# Patient Record
Sex: FEMALE | Race: WHITE | NOT HISPANIC OR LATINO | Employment: UNEMPLOYED | URBAN - METROPOLITAN AREA
[De-identification: names, ages, dates, MRNs, and addresses within clinical notes are randomized per-mention and may not be internally consistent; named-entity substitution may affect disease eponyms.]

---

## 2017-09-26 ENCOUNTER — ALLSCRIPTS OFFICE VISIT (OUTPATIENT)
Dept: OTHER | Facility: OTHER | Age: 52
End: 2017-09-26

## 2017-10-10 ENCOUNTER — ANESTHESIA EVENT (OUTPATIENT)
Dept: PERIOP | Facility: HOSPITAL | Age: 52
End: 2017-10-10
Payer: COMMERCIAL

## 2017-10-11 ENCOUNTER — HOSPITAL ENCOUNTER (OUTPATIENT)
Facility: HOSPITAL | Age: 52
Setting detail: OUTPATIENT SURGERY
Discharge: HOME | End: 2017-10-11
Attending: INTERNAL MEDICINE | Admitting: INTERNAL MEDICINE
Payer: COMMERCIAL

## 2017-10-11 ENCOUNTER — GENERIC CONVERSION - ENCOUNTER (OUTPATIENT)
Dept: OTHER | Facility: OTHER | Age: 52
End: 2017-10-11

## 2017-10-11 ENCOUNTER — ANESTHESIA (OUTPATIENT)
Dept: PERIOP | Facility: HOSPITAL | Age: 52
End: 2017-10-11
Payer: COMMERCIAL

## 2017-10-11 VITALS
OXYGEN SATURATION: 100 % | BODY MASS INDEX: 20.03 KG/M2 | TEMPERATURE: 98.2 F | SYSTOLIC BLOOD PRESSURE: 127 MMHG | DIASTOLIC BLOOD PRESSURE: 59 MMHG | HEART RATE: 75 BPM | HEIGHT: 68 IN | WEIGHT: 132.2 LBS | RESPIRATION RATE: 18 BRPM

## 2017-10-11 LAB — GLUCOSE SERPL-MCNC: 135 MG/DL (ref 65–140)

## 2017-10-11 PROCEDURE — 82948 REAGENT STRIP/BLOOD GLUCOSE: CPT

## 2017-10-11 RX ORDER — SODIUM CHLORIDE, SODIUM LACTATE, POTASSIUM CHLORIDE, CALCIUM CHLORIDE 600; 310; 30; 20 MG/100ML; MG/100ML; MG/100ML; MG/100ML
50 INJECTION, SOLUTION INTRAVENOUS CONTINUOUS
Status: DISCONTINUED | OUTPATIENT
Start: 2017-10-11 | End: 2017-10-11 | Stop reason: HOSPADM

## 2017-10-11 RX ORDER — PROPOFOL 10 MG/ML
INJECTION, EMULSION INTRAVENOUS AS NEEDED
Status: DISCONTINUED | OUTPATIENT
Start: 2017-10-11 | End: 2017-10-11 | Stop reason: SURG

## 2017-10-11 RX ADMIN — SODIUM CHLORIDE, SODIUM LACTATE, POTASSIUM CHLORIDE, AND CALCIUM CHLORIDE: .6; .31; .03; .02 INJECTION, SOLUTION INTRAVENOUS at 07:12

## 2017-10-11 RX ADMIN — PROPOFOL 30 MG: 10 INJECTION, EMULSION INTRAVENOUS at 07:34

## 2017-10-11 RX ADMIN — PROPOFOL 20 MG: 10 INJECTION, EMULSION INTRAVENOUS at 07:27

## 2017-10-11 RX ADMIN — PROPOFOL 110 MG: 10 INJECTION, EMULSION INTRAVENOUS at 07:26

## 2017-10-11 RX ADMIN — PROPOFOL 30 MG: 10 INJECTION, EMULSION INTRAVENOUS at 07:29

## 2017-10-11 RX ADMIN — PROPOFOL 30 MG: 10 INJECTION, EMULSION INTRAVENOUS at 07:31

## 2017-10-11 RX ADMIN — PROPOFOL 30 MG: 10 INJECTION, EMULSION INTRAVENOUS at 07:36

## 2017-10-11 NOTE — OP NOTE
**** GI/ENDOSCOPY REPORT ****     PATIENT NAME: ODALYS ORTIZ ------ VISIT ID:  Patient ID:   [id number] YOB: 1965     INTRODUCTION: Colonoscopy - A 45 female patient presents for an outpatient   Colonoscopy at [address] [place] [address]  PREVIOUS COLONOSCOPY:     INDICATIONS: Anemia  Average-risk screening for colon cancer  CONSENT:  The benefits, risks, and alternatives to the procedure were   discussed and informed consent was obtained from the patient  PREPARATION: EKG, pulse, pulse oximetry and blood pressure were monitored   throughout the procedure  The patient was identified by myself both   verbally and by visual inspection of ID band  Airway Assessment   Classification: Airway class 2 - Visualization of the soft palate, fauces   and uvula  ASA Classification: Class 2 - Patient has mild to moderate   systemic disturbance that may or may not be related to the disorder   requiring surgery  The patient was kept NPO for eight hours prior to the   procedure  The patient received Nulytely in preparation for the procedure  MEDICATIONS: Anesthesia-check records MAC anesthesia  PROCEDURE:  The endoscope was passed without difficulty through the anus   under direct visualization and advanced to the cecum, confirmed by   appendiceal orifice and ileocecal valve  The scope was withdrawn and the   mucosa was carefully examined  The quality of the preparation was good  Cecal Intubation Time: 4 minutes(s) Scope Withdrawal Time: 7 minutes(s)     RECTAL EXAM: Normal rectal exam      FINDINGS:  There was evidence of diverticulosis in the sigmoid colon  The   cecum, hepatic flexure, transverse colon, splenic flexure, descending   colon, sigmoid colon, rectosigmoid junction, and rectum appeared to be   normal  The ileocecal valve was intubated revealing a normal terminal   ileum  Stool obscured the view at times  COMPLICATIONS: There were no complications       IMPRESSIONS: Diverticulosis found in the sigmoid colon  Normal cecum,   hepatic flexure, transverse colon, splenic flexure, descending colon,   sigmoid colon, rectosigmoid junction, and rectum  The ileocecal valve was   intubated revealing a normal terminal ileum  Stool obscured the view at   times  RECOMMENDATIONS: Colonoscopy recommended in 10 years  Consider Hematology   evaluation  ESTIMATED BLOOD LOSS: None  PATHOLOGY SPECIMENS: No     PROCEDURE CODES: Colonoscopy     ICD-9 Codes: V76 51 Special screening for malignant neoplasms of colon   562 10 Diverticulosis of colon (without mention of hemorrhage)     ICD-10 Codes: D64 9 Anemia, unspecified Z12 11 Encounter for screening for   malignant neoplasm of colon K57 Diverticular disease of intestine     PERFORMED BY: KEEGAN Chen  on 10/11/2017  Version 1, electronically signed by KEEGAN Garcia  on   10/11/2017 at 07:42

## 2017-10-11 NOTE — ANESTHESIA PREPROCEDURE EVALUATION
Review of Systems/Medical History  Patient summary reviewed  Chart reviewed  No history of anesthetic complications     Cardiovascular  Exercise tolerance: poor, Exercise comment: Limited by back pain Hyperlipidemia,    Pulmonary  Negative pulmonary ROS ,        GI/Hepatic    GERD ,        Negative  ROS        Endo/Other  Diabetes well controlled type 2 Oral agent,      GYN  Negative gynecology ROS          Hematology  Anemia iron deficiency anemia,     Musculoskeletal  Back pain , lumbar pain and spinal stenosis,        Neurology  Negative neurology ROS      Psychology     Chronic opioid dependence         Physical Exam    Airway    Mallampati score: I  TM Distance: >3 FB  Neck ROM: full     Dental       Cardiovascular      Pulmonary      Other Findings        Anesthesia Plan  ASA Score- 2       Anesthesia Type- IV sedation with anesthesia with ASA Monitors  Additional Monitors:   Airway Plan:           Induction- intravenous  Informed Consent- Anesthetic plan and risks discussed with patient  I personally reviewed this patient with the CRNA  Discussed and agreed on the Anesthesia Plan with the CRNA  Maria E Whitley

## 2017-10-11 NOTE — ANESTHESIA POSTPROCEDURE EVALUATION
Post-Op Assessment Note      CV Status:  Stable    Mental Status:  Somnolent    Hydration Status:  Stable    PONV Controlled:  None    Airway Patency:  Patent    Post Op Vitals Reviewed: Yes          Staff: CRNA           BP   127/59   Temp      Pulse  77   Resp   14   SpO2   99% on 2LNC   Post procedure VS noted above, SV non obstructed

## 2018-01-14 VITALS
WEIGHT: 133.5 LBS | SYSTOLIC BLOOD PRESSURE: 112 MMHG | BODY MASS INDEX: 20.23 KG/M2 | HEIGHT: 68 IN | DIASTOLIC BLOOD PRESSURE: 70 MMHG | HEART RATE: 80 BPM

## 2018-01-15 NOTE — RESULT NOTES
Verified Results  (1) TISSUE EXAM 24Oct2016 09:42AM Elroy Para     Test Name Result Flag Reference   LAB AP CASE REPORT (Report)     Surgical Pathology Report             Case: [id number]                   Authorizing Provider: Damaris Gutierrez MD  Collected:      10/24/2016 1960        Ordering Location:   [facility]    Received:      10/24/2016 [address] Operating Room                            Pathologist:      José Bruno MD                                 Specimens:  A) - Duodenum, Cold bx- r/o celiac                                   B) - Stomach, Cold bx- r/o H  Pylori   LAB AP FINAL DIAGNOSIS (Report)     A  Duodenum (biopsy):    - Small bowel mucosa with no significant pathologic abnormalities  - No villous atrophy, increased intraepithelial lymphocytes or crypt   hyperplasia to suggest     malabsorptive enteropathy     - No active inflammation, granulomas, organisms, dysplasia or neoplasia   identified  B  Stomach (biopsy):    - Chronic inactive gastritis with lymphoid aggregate formation   involving oxyntic and foveolar mucosa  - Immunostain for H  pylori (with appropriate positive control) is   negative  - No intestinal metaplasia, dysplasia or neoplasia identified  Electronically signed by José Bruno MD on 10/26/2016 at 1:51 PM   LAB AP NOTE      Interpretation performed at [facility], [address] [place] [address], [address] [place] [address]  LAB AP SURGICAL ADDITIONAL INFORMATION (Report)     These tests were developed and their performance characteristics   determined by [facility] or [facility]  They may not be cleared or approved by the [place]  Food and   Drug Administration  The FDA has determined that such clearance or   approval is not necessary  These tests are used for clinical purposes  They should not be regarded as investigational or for research   This   laboratory has been approved by CLIA 88, designated as a high-complexity   laboratory and is qualified to perform these tests  LAB AP GROSS DESCRIPTION (Report)     A  The specimen is received in formalin, labeled with the patient's name   and hospital number, and is designated duodenum biopsy rule out celiac  The specimen consists of 3 tan soft tissue fragments measuring 0 2, 0 2   and 0 4 cm  Entirely submitted  One cassette  B  The specimen is received in formalin, labeled with the patient's name   and hospital number, and is designated stomach biopsy rule out H    pylori  The specimen consists of multiple tan soft tissue fragments   measuring in aggregate 0 5 x 0 5 x 0 1 cm  Entirely submitted  One   cassette  Note: The estimated total formalin fixation time based upon information   provided by the submitting clinician and the standard processing schedule   is 18 75 hours  [facility]   LAB AP CLINICAL INFORMATION      Epigastric pain  ???  Gastro-esophageal reflux disease without esophagitis

## 2018-02-12 ENCOUNTER — OFFICE VISIT (OUTPATIENT)
Dept: HEMATOLOGY ONCOLOGY | Facility: CLINIC | Age: 53
End: 2018-02-12
Payer: COMMERCIAL

## 2018-02-12 VITALS
HEIGHT: 68 IN | TEMPERATURE: 98.1 F | HEART RATE: 85 BPM | DIASTOLIC BLOOD PRESSURE: 72 MMHG | SYSTOLIC BLOOD PRESSURE: 130 MMHG | OXYGEN SATURATION: 99 % | BODY MASS INDEX: 25.25 KG/M2 | WEIGHT: 166.6 LBS | RESPIRATION RATE: 18 BRPM

## 2018-02-12 DIAGNOSIS — E53.8 VITAMIN B12 DEFICIENCY: ICD-10-CM

## 2018-02-12 DIAGNOSIS — D50.9 IRON DEFICIENCY ANEMIA, UNSPECIFIED IRON DEFICIENCY ANEMIA TYPE: Primary | ICD-10-CM

## 2018-02-12 PROCEDURE — 99204 OFFICE O/P NEW MOD 45 MIN: CPT | Performed by: PHYSICIAN ASSISTANT

## 2018-02-12 NOTE — PROGRESS NOTES
Hematology/Oncology Outpatient Consult  Stratford Body 47 y o  female 1965 [id number]    Date:  2/12/2018      Assessment and Plan:  1  Iron deficiency anemia, unspecified iron deficiency anemia type   - most recent iron panel from Sept 2017: ferritin 31, iron 19, TIBC 304, iron sat 6%   - She is not able to tolerate oral iron due to constipation and stomach upset  - She had EGD in October 2016 which was negative for Celiac's   - She also had vitamin B12 def, and Vitamin D def -- leaning towards possible malabsorption    - Plan for Venofer 300 mg IV weekly x 3     - Follow up with Dr Dominik Arrington in [place] to determine maintenance schedule     - CBC and differential  - Iron Panel; Future  - C-reactive protein; Future  - Sedimentation rate, automated; Future    2  Vitamin B12 deficiency   - She has been very consistent with oral B12 since Sept 2017  At that time prior to starting B12 was 213  Will reassess prior to next follow up  If not improved then sublingual or IM route    - Vitamin B12; Future  - C-reactive protein; Future  - Sedimentation rate, automated; Future      HPI:    62-year-old female presents for consultation regarding anemia  She is known to Dr Ladan Remy in [place]  She saw GI in consultation in October 2016  She had LAP band placed in 2011  She had EGD on 10/24/16  This showed normal esophagus  GE junction was normal  Band was in correct position  No band erosions, no ulcerations  Normal stomach, normal duodenum  Advised to continue PPI and carafate  Anti-reflux measures  Sept 2017 saw Dr Pickering July again  She had iron deficiency and hemoglobin of 8  He decided to proceed with colonoscopy  Oct 2017 she had colonoscopy  Diverticulosis found in sigmoid colon  Normal cecum, hepatic flexure, transverse colon, splenic flexure, descending colon, sigmoid colon, rectosigmoid junction, and rectum  Normal terminal ileum  Colonoscopy recommended in 10 years  She takes vitamin B12 since Sept 2017  OTC pill  Also, takes vitamin D  At one time needed 50,000 units  Now is taking 500 IU daily  She also takes 250 mg valtrex per day for herpes  Also takes Vicodin daily for back pain/degenerative disease  Denies history of arthritis  She does not tolerate oral iron well due to constipation along with opoids  Fluid removed from lap band about 4 months ago  Dr Diana Bowen (bariatric surgeon) in [place]  Went back last week to have tightened agin because gained 30 lbs  Period stopped 4 5 years ago  Period lasted 7 days, changing pads and tampons every 30 min  They were this heavy for all 7 days  [id number] had back surgery and needed to have iron infusion prior  Mammogram is up to date  GYN is with [email or web address]  This is up to date  Interval history: swelling of the hands for the past 6 months, increase in fatigue for the past 6 months, hot flashes throughout the day at night, numbness/tingling of hands same time as swelling     ROS: Review of Systems   Constitutional: Positive for fatigue  Negative for appetite change, chills, fever and unexpected weight change  HENT: Negative for mouth sores  Respiratory: Negative for cough, chest tightness, shortness of breath and wheezing  Cardiovascular: Negative for chest pain, palpitations and leg swelling  Gastrointestinal: Negative for abdominal pain, blood in stool, constipation, diarrhea, nausea and vomiting  Genitourinary: Negative for difficulty urinating, dysuria and hematuria  Musculoskeletal: Positive for back pain  Negative for joint swelling  Skin: Negative  Neurological: Negative for dizziness, weakness, light-headedness, numbness and headaches  Hematological: Negative  Psychiatric/Behavioral: Negative          Past Medical History:   Diagnosis Date    Acid reflux     Anemia     Diabetes mellitus (HCC)     Herpes simplex     genital    Hyperlipidemia     Vitamin D deficiency        Past Surgical History:   Procedure Laterality Date    BACK SURGERY      BREAST SURGERY      augmentation     SECTION      LAPAROSCOPIC GASTRIC BANDING      [place] COLONOSCOPY FLX DX W/COLLJ SPEC WHEN PFRMD N/A 10/11/2017    Procedure: COLONOSCOPY;  Surgeon: Barnie Gosselin, MD;  [facility]; Service: Gastroenterology    [place] ESOPHAGOGASTRODUODENOSCOPY TRANSORAL DIAGNOSTIC N/A 10/24/2016    Procedure: ESOPHAGOGASTRODUODENOSCOPY (EGD); Surgeon: Barnie Gosselin, MD;  [facility];  Service: Gastroenterology    TONSILLECTOMY      TUBAL LIGATION         Social History     Social History    Marital status: /Civil Union     Spouse name: N/A    Number of children: N/A    Years of education: N/A     Social History Main Topics    Smoking status: Former Smoker     Quit date: 10/11/1990    Smokeless tobacco: Never Used    Alcohol use Yes      Comment: rarely    Drug use: No    Sexual activity: Not on file     Other Topics Concern    Not on file     Social History Narrative    No narrative on file       No family history on file  No Known Allergies      Current Outpatient Prescriptions:     Cholecalciferol (VITAMIN D3) 1000 UNITS CAPS, Take by mouth, Disp: , Rfl:     Hydrocodone-Acetaminophen (VICODIN PO), Take by mouth, Disp: , Rfl:     metFORMIN (GLUCOPHAGE) 500 mg tablet, Take 500 mg by mouth 2 (two) times a day with meals, Disp: , Rfl:     pantoprazole (PROTONIX) 40 mg tablet, Take 40 mg by mouth daily, Disp: , Rfl:     simvastatin (ZOCOR) 10 mg tablet, Take 10 mg by mouth daily at bedtime, Disp: , Rfl:     tiZANidine (ZANAFLEX) 4 mg tablet, Take 4 mg by mouth every 6 (six) hours as needed for muscle spasms, Disp: , Rfl:     valACYclovir (VALTREX) 500 mg tablet, Take 500 mg by mouth 2 (two) times a day, Disp: , Rfl:       Physical Exam:  There were no vitals taken for this visit  Physical Exam   Constitutional: She is oriented to person, place, and time   She appears well-developed and well-nourished  No distress  HENT:   Head: Normocephalic and atraumatic  Eyes: Conjunctivae are normal  No scleral icterus  Neck: Normal range of motion  Neck supple  Cardiovascular: Normal rate, regular rhythm and normal heart sounds  No murmur heard  Pulmonary/Chest: Effort normal and breath sounds normal  No respiratory distress  Abdominal: Soft  There is no tenderness  Musculoskeletal: Normal range of motion  She exhibits no edema or tenderness  Lymphadenopathy:     She has no cervical adenopathy  Neurological: She is alert and oriented to person, place, and time  No cranial nerve deficit  Skin: Skin is warm and dry  Psychiatric: She has a normal mood and affect  Labs:  2011: hemoglobin 11 9  2013: 9 7   2016: 8 8  12/28/17: 9 7     Ferritin  2/2016 - 14 2/2017 - 8 9/2017 31 Sept 2017  B12 213  Folate 7 2   iron panel: ferritin 31, iron 19, TIBC 304, iron sat 6%      Patient voiced understanding and agreement in the above discussion  Aware to contact our office with questions/symptoms in the interim

## 2018-02-19 RX ORDER — SODIUM CHLORIDE 9 MG/ML
20 INJECTION, SOLUTION INTRAVENOUS CONTINUOUS
Status: DISCONTINUED | OUTPATIENT
Start: 2018-02-20 | End: 2018-02-23 | Stop reason: HOSPADM

## 2018-02-20 ENCOUNTER — HOSPITAL ENCOUNTER (OUTPATIENT)
Dept: INFUSION CENTER | Facility: CLINIC | Age: 53
Discharge: HOME | End: 2018-02-20
Payer: COMMERCIAL

## 2018-02-20 VITALS
RESPIRATION RATE: 16 BRPM | SYSTOLIC BLOOD PRESSURE: 134 MMHG | TEMPERATURE: 97.9 F | HEART RATE: 78 BPM | DIASTOLIC BLOOD PRESSURE: 63 MMHG

## 2018-02-20 PROCEDURE — 96365 THER/PROPH/DIAG IV INF INIT: CPT

## 2018-02-20 PROCEDURE — 96366 THER/PROPH/DIAG IV INF ADDON: CPT

## 2018-02-20 RX ADMIN — IRON SUCROSE 300 MG: 20 INJECTION, SOLUTION INTRAVENOUS at 09:26

## 2018-02-20 RX ADMIN — SODIUM CHLORIDE 20 ML/HR: 0.9 INJECTION, SOLUTION INTRAVENOUS at 09:26

## 2018-02-20 NOTE — PLAN OF CARE
Problem: Potential for Falls  Goal: Patient will remain free of falls  INTERVENTIONS:  - Assess patient frequently for physical needs  -  Identify cognitive and physical deficits and behaviors that affect risk of falls    -  [place] fall precautions as indicated by assessment   - Educate patient/family on patient safety including physical limitations  - Instruct patient to call for assistance with activity based on assessment  - Modify environment to reduce risk of injury  - Consider OT/PT consult to assist with strengthening/mobility   Outcome: Progressing

## 2018-02-20 NOTE — PROGRESS NOTES
Pt arrived at infusion center for venofer infusion  Pt tolerated infusion without difficulty or complaint  Pt aware of future appointments  Pt d/heather in stable condition

## 2018-02-26 RX ORDER — SODIUM CHLORIDE 9 MG/ML
20 INJECTION, SOLUTION INTRAVENOUS CONTINUOUS
Status: DISCONTINUED | OUTPATIENT
Start: 2018-02-27 | End: 2018-03-02 | Stop reason: HOSPADM

## 2018-02-27 ENCOUNTER — HOSPITAL ENCOUNTER (OUTPATIENT)
Dept: INFUSION CENTER | Facility: CLINIC | Age: 53
Discharge: HOME | End: 2018-02-27
Payer: COMMERCIAL

## 2018-02-27 VITALS
SYSTOLIC BLOOD PRESSURE: 130 MMHG | HEART RATE: 81 BPM | RESPIRATION RATE: 18 BRPM | DIASTOLIC BLOOD PRESSURE: 59 MMHG | TEMPERATURE: 98.3 F

## 2018-02-27 PROCEDURE — 96365 THER/PROPH/DIAG IV INF INIT: CPT

## 2018-02-27 PROCEDURE — 96366 THER/PROPH/DIAG IV INF ADDON: CPT

## 2018-02-27 RX ORDER — ACETAMINOPHEN 325 MG/1
650 TABLET ORAL ONCE
Status: COMPLETED | OUTPATIENT
Start: 2018-02-27 | End: 2018-02-27

## 2018-02-27 RX ORDER — ACETAMINOPHEN 325 MG/1
650 TABLET ORAL ONCE
Status: DISCONTINUED | OUTPATIENT
Start: 2018-02-27 | End: 2018-02-27

## 2018-02-27 RX ADMIN — SODIUM CHLORIDE 20 ML/HR: 0.9 INJECTION, SOLUTION INTRAVENOUS at 09:30

## 2018-02-27 RX ADMIN — IRON SUCROSE: 20 INJECTION, SOLUTION INTRAVENOUS at 10:05

## 2018-02-27 RX ADMIN — ACETAMINOPHEN 650 MG: 325 TABLET ORAL at 09:57

## 2018-02-27 NOTE — PLAN OF CARE
Problem: Potential for Falls  Goal: Patient will remain free of falls  INTERVENTIONS:  - Assess patient frequently for physical needs  -  Identify cognitive and physical deficits and behaviors that affect risk of falls    -  [place] fall precautions as indicated by assessment   - Educate patient/family on patient safety including physical limitations  - Instruct patient to call for assistance with activity based on assessment  - Modify environment to reduce risk of injury  - Consider OT/PT consult to assist with strengthening/mobility   Outcome: Progressing

## 2018-02-27 NOTE — PROGRESS NOTES
Called Dr Kb Holloway office and spoke w/ Ean re: pt's last infusion  Pt states she was 'violently vomiting' all day, chills, headache until the next morning  Pt willing to try another dose  Per Ean, extend infusion time to 2 hours and will send order to premedicate with tylenol 650mg  Pt to report any side effects to Dr Kb Holloway office to possibly change infusion should she have any adverse effects again

## 2018-02-27 NOTE — PROGRESS NOTES
Pt tolerated infusion well, offered no complaints  Pt to call office should she get severe headache/vomiting later today from today's tx

## 2018-03-05 RX ORDER — SODIUM CHLORIDE 9 MG/ML
20 INJECTION, SOLUTION INTRAVENOUS CONTINUOUS
Status: DISCONTINUED | OUTPATIENT
Start: 2018-03-06 | End: 2018-03-09 | Stop reason: HOSPADM

## 2018-03-06 ENCOUNTER — HOSPITAL ENCOUNTER (OUTPATIENT)
Dept: INFUSION CENTER | Facility: CLINIC | Age: 53
Discharge: HOME | End: 2018-03-06
Payer: COMMERCIAL

## 2018-03-06 VITALS
RESPIRATION RATE: 18 BRPM | DIASTOLIC BLOOD PRESSURE: 63 MMHG | SYSTOLIC BLOOD PRESSURE: 153 MMHG | HEART RATE: 83 BPM | TEMPERATURE: 98.4 F

## 2018-03-06 PROCEDURE — 96366 THER/PROPH/DIAG IV INF ADDON: CPT

## 2018-03-06 PROCEDURE — 96365 THER/PROPH/DIAG IV INF INIT: CPT

## 2018-03-06 RX ADMIN — SODIUM CHLORIDE 20 ML/HR: 0.9 INJECTION, SOLUTION INTRAVENOUS at 09:49

## 2018-03-06 RX ADMIN — IRON SUCROSE 300 MG: 20 INJECTION, SOLUTION INTRAVENOUS at 09:53

## 2018-04-13 ENCOUNTER — OFFICE VISIT (OUTPATIENT)
Dept: HEMATOLOGY ONCOLOGY | Facility: CLINIC | Age: 53
End: 2018-04-13
Payer: COMMERCIAL

## 2018-04-13 VITALS
HEIGHT: 67 IN | BODY MASS INDEX: 25.98 KG/M2 | DIASTOLIC BLOOD PRESSURE: 74 MMHG | HEART RATE: 72 BPM | RESPIRATION RATE: 18 BRPM | WEIGHT: 165.5 LBS | SYSTOLIC BLOOD PRESSURE: 124 MMHG | TEMPERATURE: 98.4 F | OXYGEN SATURATION: 99 %

## 2018-04-13 DIAGNOSIS — D50.9 IRON DEFICIENCY ANEMIA, UNSPECIFIED IRON DEFICIENCY ANEMIA TYPE: Primary | ICD-10-CM

## 2018-04-13 DIAGNOSIS — K95.09 GASTRIC BAND SLIPPAGE: ICD-10-CM

## 2018-04-13 PROCEDURE — 99214 OFFICE O/P EST MOD 30 MIN: CPT | Performed by: INTERNAL MEDICINE

## 2018-04-13 NOTE — PROGRESS NOTES
Hematology/Oncology Outpatient Consult  Reji Mittal 47 y o  female 1965 [id number]    Date:  4/13/2018      Assessment and Plan:  1  Iron deficiency anemia, unspecified iron deficiency anemia type  - Sept 2017:   Hemoglobin 9 7    ferritin 31, iron 19, TIBC 304, iron sat 6%  - 2/2018:   Received IV Venofer 300 mg weekly x3   - 4/2018: Hemoglobin 11 8,  MCV 84, iron panel within normal limit, iron 87, ferritin 240        -  This is secondary to gastric sleeve  - She is not able to tolerate oral iron due to constipation and stomach upset  - She had EGD in October 2016 which was negative for Celiac's        Plan  -  CBC, iron panel every 3 months  Patient will call if level is low, and arrange follow-up appointment  -   Follow-up p r n  2  Vitamin B12 deficiency   - She has been very consistent with oral B12 since Sept 2017  At that time prior to starting B12 was 213  Will reassess prior to next follow up  If not improved then sublingual or IM route    - Vitamin B12; Future         HPI:    62-year-old female presents for consultation regarding anemia  She is known to Dr Rey Mancia in GI  She saw GI in consultation in October 2016  She had LAP band placed in 2011  She had EGD on 10/24/16  This showed normal esophagus  GE junction was normal  Band was in correct position  No band erosions, no ulcerations  Normal stomach, normal duodenum  Advised to continue PPI and carafate  Anti-reflux measures  Sept 2017 saw Dr Rey Mancia again  She had iron deficiency and hemoglobin of 8  He decided to proceed with colonoscopy  Oct 2017 she had colonoscopy  Diverticulosis found in sigmoid colon  Normal cecum, hepatic flexure, transverse colon, splenic flexure, descending colon, sigmoid colon, rectosigmoid junction, and rectum  Normal terminal ileum  Colonoscopy recommended in 10 years  She takes vitamin B12 since Sept 2017  OTC pill  Also, takes vitamin D  At one time needed 50,000 units   Now is taking 500 IU daily  She also takes 250 mg valtrex per day for herpes  Also takes Vicodin daily for back pain/degenerative disease  Denies history of arthritis  She does not tolerate oral iron well due to constipation along with opoids  Fluid removed from lap band about 4 months ago  Dr Dodie Kaur (bariatric surgeon) in [place]  Went back last week to have tightened agin because gained 30 lbs  Period stopped 4 5 years ago  Period lasted 7 days, changing pads and tampons every 30 min  They were this heavy for all 7 days  [id number] had back surgery and needed to have iron infusion prior  Mammogram is up to date  GYN is with [employer]  This is up to date  Interval history:     4/13:   Reported tolerated the IV iron well, given by 2 hours  The 1st injection caused some nausea and vomiting  Patient has no PICA  Overall feeling well,   Good energy level, strength  ROS: Review of Systems   Constitutional: Positive for fatigue  Negative for appetite change, chills, fever and unexpected weight change  HENT: Negative for mouth sores  Respiratory: Negative for cough, chest tightness, shortness of breath and wheezing  Cardiovascular: Negative for chest pain, palpitations and leg swelling  Gastrointestinal: Negative for abdominal pain, blood in stool, constipation, diarrhea, nausea and vomiting  Genitourinary: Negative for difficulty urinating, dysuria and hematuria  Musculoskeletal: Positive for back pain  Negative for joint swelling  Skin: Negative  Neurological: Negative for dizziness, weakness, light-headedness, numbness and headaches  Hematological: Negative  Psychiatric/Behavioral: Negative          Past Medical History:   Diagnosis Date    Acid reflux     Anemia     Diabetes mellitus (HCC)     Herpes simplex     genital    Hyperlipidemia     Vitamin D deficiency        Past Surgical History:   Procedure Laterality Date    BACK SURGERY      BREAST SURGERY augmentation     SECTION      LAPAROSCOPIC GASTRIC BANDING      [place] COLONOSCOPY FLX DX W/COLLJ SPEC WHEN PFRMD N/A 10/11/2017    Procedure: COLONOSCOPY;  Surgeon: Jared Carr MD;  [facility]; Service: Gastroenterology    [place] ESOPHAGOGASTRODUODENOSCOPY TRANSORAL DIAGNOSTIC N/A 10/24/2016    Procedure: ESOPHAGOGASTRODUODENOSCOPY (EGD);   Surgeon: Jared Carr MD;  [facility];  Service: Gastroenterology    TONSILLECTOMY      TUBAL LIGATION         Social History     Social History    Marital status: /Civil Union     Spouse name: N/A    Number of children: N/A    Years of education: N/A     Social History Main Topics    Smoking status: Former Smoker     Quit date: 10/11/1990    Smokeless tobacco: Never Used    Alcohol use Yes      Comment: rarely    Drug use: No    Sexual activity: Not Asked     Other Topics Concern    None     Social History Narrative    None       Family History   Problem Relation Age of Onset    Pancreatic cancer Father     Bone cancer Maternal Grandmother     Pancreatic cancer Paternal Grandmother        No Known Allergies      Current Outpatient Prescriptions:     Cholecalciferol (VITAMIN D3) 1000 UNITS CAPS, Take by mouth, Disp: , Rfl:     Cyanocobalamin (VITAMIN B-12 CR PO), Take by mouth, Disp: , Rfl:     Hydrocodone-Acetaminophen (VICODIN PO), Take by mouth, Disp: , Rfl:     metFORMIN (GLUCOPHAGE) 500 mg tablet, Take 500 mg by mouth 2 (two) times a day with meals, Disp: , Rfl:     pantoprazole (PROTONIX) 40 mg tablet, Take 40 mg by mouth daily, Disp: , Rfl:     simvastatin (ZOCOR) 10 mg tablet, Take 10 mg by mouth daily at bedtime, Disp: , Rfl:     tiZANidine (ZANAFLEX) 4 mg tablet, Take 4 mg by mouth every 6 (six) hours as needed for muscle spasms, Disp: , Rfl:     valACYclovir (VALTREX) 500 mg tablet, Take 500 mg by mouth 2 (two) times a day, Disp: , Rfl:       Physical Exam:  /74 (BP Location: Right arm, Patient Position: Sitting, Cuff Size: Adult)   Pulse 72   Temp 98 4 °F (36 9 °C) (Oral)   Resp 18   Ht 5' 7" (1 702 m)   Wt 75 1 kg (165 lb 8 oz)   SpO2 99%   BMI 25 92 kg/m²     Physical Exam   Constitutional: She is oriented to person, place, and time  She appears well-developed and well-nourished  No distress  HENT:   Head: Normocephalic and atraumatic  Eyes: Conjunctivae are normal  No scleral icterus  Neck: Normal range of motion  Neck supple  Cardiovascular: Normal rate, regular rhythm and normal heart sounds  No murmur heard  Pulmonary/Chest: Effort normal and breath sounds normal  No respiratory distress  Abdominal: Soft  There is no tenderness  Musculoskeletal: Normal range of motion  She exhibits no edema or tenderness  Lymphadenopathy:     She has no cervical adenopathy  Neurological: She is alert and oriented to person, place, and time  No cranial nerve deficit  Skin: Skin is warm and dry  Psychiatric: She has a normal mood and affect  Labs:  2011: hemoglobin 11 9  2013: 9 7   2016: 8 8  12/28/17: 9 7     Ferritin  2/2016 - 14 2/2017 - 8 9/2017 31 Sept 2017  B12 213  Folate 7 2   iron panel: ferritin 31, iron 19, TIBC 304, iron sat 6%      Patient voiced understanding and agreement in the above discussion  Aware to contact our office with questions/symptoms in the interim

## 2018-04-23 DIAGNOSIS — K21.9 GERD WITHOUT ESOPHAGITIS: Primary | ICD-10-CM

## 2018-04-23 RX ORDER — METOCLOPRAMIDE 10 MG/1
TABLET ORAL
Qty: 30 TABLET | Refills: 5 | Status: SHIPPED | OUTPATIENT
Start: 2018-04-23

## 2021-04-08 DIAGNOSIS — Z23 ENCOUNTER FOR IMMUNIZATION: ICD-10-CM

## 2023-05-17 ENCOUNTER — OFFICE VISIT (OUTPATIENT)
Dept: PODIATRY | Facility: CLINIC | Age: 58
End: 2023-05-17

## 2023-05-17 ENCOUNTER — HOSPITAL ENCOUNTER (OUTPATIENT)
Dept: RADIOLOGY | Facility: HOSPITAL | Age: 58
Discharge: HOME | End: 2023-05-17
Attending: PODIATRIST

## 2023-05-17 VITALS
SYSTOLIC BLOOD PRESSURE: 148 MMHG | OXYGEN SATURATION: 100 % | BODY MASS INDEX: 25.92 KG/M2 | DIASTOLIC BLOOD PRESSURE: 86 MMHG | HEART RATE: 91 BPM | HEIGHT: 67 IN

## 2023-05-17 DIAGNOSIS — R52 PAIN: ICD-10-CM

## 2023-05-17 DIAGNOSIS — S92.514A CLOSED NONDISPLACED FRACTURE OF PROXIMAL PHALANX OF LESSER TOE OF RIGHT FOOT, INITIAL ENCOUNTER: Primary | ICD-10-CM

## 2023-05-17 RX ORDER — ESCITALOPRAM OXALATE 10 MG/1
10 TABLET ORAL
COMMUNITY
Start: 2023-04-12 | End: 2024-04-11

## 2023-05-17 RX ORDER — BLOOD SUGAR DIAGNOSTIC
STRIP MISCELLANEOUS
COMMUNITY
Start: 2023-04-07

## 2023-05-17 NOTE — PROGRESS NOTES
Assessment/Plan:    X-rays taken of the patient's right foot today shows a very minimally displaced oblique fracture through the proximal phalanx of the right third toe  Overall alignment remains satisfactory  On clinical examination, there is mild residual edema to the right third digit with some mild tenderness to palpation  There is no erythema nor ecchymosis  There are no open skin lesions  Neurovascular status intact  X-rays and treatment options were discussed with the patient  The cam boot is starting to cause significant pain to her lower back  She has been in the boot for the past 2 weeks  Today, we will transition her to a stiff soled surgical shoe to continue to offload and protect the fracture site while hopefully easing her lower back discomfort  Recommend follow-up in 2 to 3 weeks for repeat x-rays of the right foot  Diagnoses and all orders for this visit:    Closed nondisplaced fracture of proximal phalanx of lesser toe of right foot, initial encounter  -     Darkco Shoe    Pain  -     X-ray foot right 3+ views; Future  -     Darkco Shoe    Other orders  -     escitalopram (LEXAPRO) 10 mg tablet; Take 10 mg by mouth  -     OneTouch Ultra test strip          Subjective:      Patient ID: Thang Wong is a 61 y o  female  The patient presents today for her initial consultation with [facility]'s podiatry with a chief complaint of a right third toe fracture that is been present for about 3 weeks  She initially hurt the toe when she was making her bed and jammed it against the bedpost   She waited a week before she sought medical attention as she was still having pain and swelling  X-rays taken 2 weeks ago revealed a fracture to the right third toe  Her primary care physician was concerned about its alignment and recommended that she follow-up with podiatry  She presents today in a cam boot with some mild residual tenderness in her right third toe    She does note persistent "swelling in the digit  She does note a history of low back pain and the boot is now exacerbating her lower back discomfort  The following portions of the patient's history were reviewed and updated as appropriate: allergies, current medications, past family history, past medical history, past social history, past surgical history and problem list     Review of Systems   Constitutional: Negative  HENT: Negative  Eyes: Negative  Respiratory: Negative  Cardiovascular: Negative  Endocrine: Negative  Musculoskeletal: Negative  Neurological: Negative  Hematological: Negative  Psychiatric/Behavioral: Negative  Objective:      /86 (BP Location: Left arm, Patient Position: Sitting, Cuff Size: Standard)   Pulse 91   Ht 5' 7\" (1 702 m)   SpO2 100%   BMI 25 92 kg/m²          Physical Exam  Constitutional:       Appearance: Normal appearance  HENT:      Head: Normocephalic and atraumatic  Nose: Nose normal    Cardiovascular:      Pulses:           Dorsalis pedis pulses are 2+ on the right side  Posterior tibial pulses are 2+ on the right side  Pulmonary:      Effort: Pulmonary effort is normal    Feet:      Right foot:      Skin integrity: Skin integrity normal       Comments: On clinical examination, there is mild residual edema to the right third digit with some mild tenderness to palpation  There is no erythema nor ecchymosis  There are no open skin lesions  Neurovascular status intact  Skin:     General: Skin is warm  Capillary Refill: Capillary refill takes less than 2 seconds  Neurological:      General: No focal deficit present  Mental Status: She is alert and oriented to person, place, and time  Psychiatric:         Mood and Affect: Mood normal          Behavior: Behavior normal          Thought Content:  Thought content normal          "

## 2023-06-08 ENCOUNTER — HOSPITAL ENCOUNTER (OUTPATIENT)
Dept: RADIOLOGY | Facility: HOSPITAL | Age: 58
End: 2023-06-08
Attending: PODIATRIST
Payer: COMMERCIAL

## 2023-06-08 ENCOUNTER — OFFICE VISIT (OUTPATIENT)
Dept: PODIATRY | Facility: CLINIC | Age: 58
End: 2023-06-08
Payer: COMMERCIAL

## 2023-06-08 VITALS
HEIGHT: 67 IN | WEIGHT: 163 LBS | SYSTOLIC BLOOD PRESSURE: 140 MMHG | OXYGEN SATURATION: 96 % | HEART RATE: 100 BPM | BODY MASS INDEX: 25.58 KG/M2 | DIASTOLIC BLOOD PRESSURE: 75 MMHG

## 2023-06-08 DIAGNOSIS — S92.514A CLOSED NONDISPLACED FRACTURE OF PROXIMAL PHALANX OF LESSER TOE OF RIGHT FOOT, INITIAL ENCOUNTER: Primary | ICD-10-CM

## 2023-06-08 DIAGNOSIS — S92.514A CLOSED NONDISPLACED FRACTURE OF PROXIMAL PHALANX OF LESSER TOE OF RIGHT FOOT, INITIAL ENCOUNTER: ICD-10-CM

## 2023-06-08 PROCEDURE — 99212 OFFICE O/P EST SF 10 MIN: CPT | Performed by: PODIATRIST

## 2023-06-08 PROCEDURE — 73630 X-RAY EXAM OF FOOT: CPT

## 2023-06-08 RX ORDER — METHOCARBAMOL 750 MG/1
TABLET, FILM COATED ORAL
COMMUNITY
Start: 2023-05-15

## 2023-06-08 RX ORDER — SEMAGLUTIDE 1.34 MG/ML
INJECTION, SOLUTION SUBCUTANEOUS
COMMUNITY
Start: 2023-04-13

## 2023-06-08 NOTE — PROGRESS NOTES
Assessment/Plan:     The patient's clinical examination today is significant for some very mild residual edema to the right third digit  There is minimal tenderness palpation of the affected digit  There are no open lesions or signs of infection  Neurovascular status is intact  Repeat x-rays of the patient's right foot taken today shows progressive healing of the right third toe proximal phalangeal fracture  There is ample callus formation  Alignment is satisfactory  The patient is doing well and has returned to regular shoe gear on her own and is ambulating without any undue difficulty  She will be return to activities and shoe gear as tolerated at this point in time  Commend follow-up on as-needed basis only  Diagnoses and all orders for this visit:    Closed nondisplaced fracture of proximal phalanx of lesser toe of right foot, initial encounter  -     X-ray foot right 3+ views; Future    Other orders  -     methocarbamol (ROBAXIN) 750 mg tablet; TAKE 1 TAB BY MOUTH 3 TIMES DAILY FOR 30 DAYS   -     Ozempic, 1 MG/DOSE, 4 MG/3ML injection pen          Subjective:     Patient ID: Brigitte Wong is a 61 y o  female  The patient presents today for follow-up of a right third toe fracture  She notes good interval improvement since her last visit  She notes minimal discomfort to the affected digit today  She has weaned herself out of the surgical shoe and has returned to his shoe without any significant pain or discomfort        PAST MEDICAL HISTORY:  Past Medical History:   Diagnosis Date   • Acid reflux    • Anemia    • Diabetes mellitus ([address] )    • Herpes simplex     genital   • Hyperlipidemia    • Vitamin D deficiency        PAST SURGICAL HISTORY:  Past Surgical History:   Procedure Laterality Date   • BACK SURGERY     • BREAST SURGERY      augmentation   •  SECTION     • LAPAROSCOPIC GASTRIC BANDING     • TX COLONOSCOPY FLX DX W/COLLJ SPEC WHEN PFRMD N/A 10/11/2017    Procedure: COLONOSCOPY;  Surgeon: Jemima Salmeron MD;  Location: [facility]; Service: Gastroenterology   • CT ESOPHAGOGASTRODUODENOSCOPY TRANSORAL DIAGNOSTIC N/A 10/24/2016    Procedure: ESOPHAGOGASTRODUODENOSCOPY (EGD); Surgeon: Jemima Salmeron MD;  Location: [facility];  Service: Gastroenterology   • TONSILLECTOMY     • TUBAL LIGATION          ALLERGIES:  Patient has no known allergies  MEDICATIONS:  Current Outpatient Medications   Medication Sig Dispense Refill   • Cholecalciferol (VITAMIN D3) 1000 UNITS CAPS Take by mouth     • Cyanocobalamin (VITAMIN B-12 CR PO) Take by mouth     • escitalopram (LEXAPRO) 10 mg tablet Take 10 mg by mouth     • Hydrocodone-Acetaminophen (VICODIN PO) Take by mouth     • metFORMIN (GLUCOPHAGE) 500 mg tablet Take 500 mg by mouth 2 (two) times a day with meals     • methocarbamol (ROBAXIN) 750 mg tablet TAKE 1 TAB BY MOUTH 3 TIMES DAILY FOR 30 DAYS  • metoclopramide (REGLAN) 10 mg tablet TAKE 1 TABLET BY MOUTH AT BEDTIME 30 tablet 5   • OneTouch Ultra test strip      • Ozempic, 1 MG/DOSE, 4 MG/3ML injection pen      • pantoprazole (PROTONIX) 40 mg tablet Take 40 mg by mouth daily     • simvastatin (ZOCOR) 10 mg tablet Take 10 mg by mouth daily at bedtime     • tiZANidine (ZANAFLEX) 4 mg tablet Take 4 mg by mouth every 6 (six) hours as needed for muscle spasms     • valACYclovir (VALTREX) 500 mg tablet Take 500 mg by mouth 2 (two) times a day       No current facility-administered medications for this visit         SOCIAL HISTORY:  Social History     Socioeconomic History   • Marital status: /Civil Union     Spouse name: None   • Number of children: None   • Years of education: None   • Highest education level: None   Occupational History   • None   Tobacco Use   • Smoking status: Former     Types: Cigarettes     Quit date: 10/11/1990     Years since quittin 6   • Smokeless tobacco: Never   Substance and Sexual Activity   • Alcohol use: Yes     Comment: rarely   • Drug use: No   • Sexual activity: None   Other Topics Concern   • None   Social History Narrative   • None     Social Determinants of Health     Financial Resource Strain: Not on file   Food Insecurity: Not on file   Transportation Needs: Not on file   Physical Activity: Not on file   Stress: Not on file   Social Connections: Not on file   Intimate Partner Violence: Not on file   Housing Stability: Not on file        Review of Systems   Constitutional: Negative  HENT: Negative  Eyes: Negative  Respiratory: Negative  Cardiovascular: Negative  Endocrine: Negative  Musculoskeletal: Negative  Skin: Negative  Neurological: Negative  Hematological: Negative  Psychiatric/Behavioral: Negative  Objective:     Physical Exam  Constitutional:       Appearance: Normal appearance  HENT:      Head: Normocephalic and atraumatic  Nose: Nose normal    Cardiovascular:      Pulses:           Dorsalis pedis pulses are 2+ on the left side  Posterior tibial pulses are 2+ on the left side  Pulmonary:      Effort: Pulmonary effort is normal    Feet:      Comments: The patient's clinical examination today is significant for some very mild residual edema to the right third digit  There is minimal tenderness palpation of the affected digit  There are no open lesions or signs of infection  Neurovascular status is intact  Skin:     General: Skin is warm  Capillary Refill: Capillary refill takes less than 2 seconds  Neurological:      General: No focal deficit present  Mental Status: She is alert and oriented to person, place, and time  Psychiatric:         Mood and Affect: Mood normal          Behavior: Behavior normal          Thought Content:  Thought content normal